# Patient Record
Sex: MALE | Race: WHITE | NOT HISPANIC OR LATINO | URBAN - METROPOLITAN AREA
[De-identification: names, ages, dates, MRNs, and addresses within clinical notes are randomized per-mention and may not be internally consistent; named-entity substitution may affect disease eponyms.]

---

## 2017-11-28 ENCOUNTER — OUTPATIENT (OUTPATIENT)
Dept: OUTPATIENT SERVICES | Facility: HOSPITAL | Age: 22
LOS: 1 days | End: 2017-11-28
Payer: COMMERCIAL

## 2017-11-28 PROCEDURE — 73130 X-RAY EXAM OF HAND: CPT | Mod: 26,RT

## 2017-11-28 PROCEDURE — 73130 X-RAY EXAM OF HAND: CPT

## 2024-09-03 ENCOUNTER — APPOINTMENT (OUTPATIENT)
Dept: ORTHOPEDIC SURGERY | Facility: CLINIC | Age: 29
End: 2024-09-03
Payer: OTHER MISCELLANEOUS

## 2024-09-03 VITALS — HEIGHT: 72 IN | BODY MASS INDEX: 25.87 KG/M2 | WEIGHT: 191 LBS

## 2024-09-03 DIAGNOSIS — S97.81XA CRUSHING INJURY OF RIGHT FOOT, INITIAL ENCOUNTER: ICD-10-CM

## 2024-09-03 DIAGNOSIS — S92.411A DISPLACED FRACTURE OF PROXIMAL PHALANX OF RIGHT GREAT TOE, INITIAL ENCOUNTER FOR CLOSED FRACTURE: ICD-10-CM

## 2024-09-03 DIAGNOSIS — Z78.9 OTHER SPECIFIED HEALTH STATUS: ICD-10-CM

## 2024-09-03 PROCEDURE — L3260: CPT | Mod: RT

## 2024-09-03 PROCEDURE — 73630 X-RAY EXAM OF FOOT: CPT | Mod: RT

## 2024-09-03 PROCEDURE — 99204 OFFICE O/P NEW MOD 45 MIN: CPT | Mod: 25

## 2024-09-03 NOTE — WORK
[Crush Injury] : crush injury [Fracture] : fracture [Was the competent medical cause of the injury] : was the competent medical cause of the injury [Are consistent with the injury] : are consistent with the injury [Consistent with my objective findings] : consistent with my objective findings [Total (100%)] : total (100%) [Does not reveal pre-existing condition(s) that may affect treatment/prognosis] : does not reveal pre-existing condition(s) that may affect treatment/prognosis [Other: ___] : [unfilled] [Cannot return to work because ________] : cannot return to work because [unfilled] [N/A] : : Not Applicable [Patient] : patient [No Rx restrictions] : No Rx restrictions. [I provided the services listed above] :  I provided the services listed above. [FreeTextEntry1] : good

## 2024-09-03 NOTE — PHYSICAL EXAM
[Right] : right foot and ankle [Moderate] : moderate swelling of toe(s) [1st] : 1st [NL (20)] : dorsiflexion 20 degrees [NL (40)] : plantar flexion 40 degrees [5___] : plantar flexion 5[unfilled]/5 [2+] : dorsalis pedis pulse: 2+ [] : patient ambulates without assistive device [FreeTextEntry3] : abrasion dorsal great toe  no overt signs of infection

## 2024-09-03 NOTE — ASSESSMENT
[FreeTextEntry1] : wbat post op shoe toe spacer ice/elevate nsaids prn out from work f/up 3 wks w/ foot xray

## 2024-09-03 NOTE — HISTORY OF PRESENT ILLNESS
[8] : 8 [1] : 2 [Not working due to injury] : Work status: not working due to injury [de-identified] : 09/03/2024:  heavy piece of metal fell on foot yesterday at work.  no treatment to date. no prior foot probs. denies dm/tob.  -- out since injury. was wearing sneakers when injury occurred [] : no [FreeTextEntry1] : right foot [de-identified] :

## 2024-09-24 ENCOUNTER — APPOINTMENT (OUTPATIENT)
Dept: ORTHOPEDIC SURGERY | Facility: CLINIC | Age: 29
End: 2024-09-24
Payer: OTHER MISCELLANEOUS

## 2024-09-24 DIAGNOSIS — S92.411D DISPLACED FRACTURE OF PROXIMAL PHALANX OF RIGHT GREAT TOE, SUBSEQUENT ENCOUNTER FOR FRACTURE WITH ROUTINE HEALING: ICD-10-CM

## 2024-09-24 PROCEDURE — 99213 OFFICE O/P EST LOW 20 MIN: CPT

## 2024-09-24 PROCEDURE — 73630 X-RAY EXAM OF FOOT: CPT | Mod: RT

## 2024-09-24 NOTE — WORK
[Crush Injury] : crush injury [Fracture] : fracture [Was the competent medical cause of the injury] : was the competent medical cause of the injury [Are consistent with the injury] : are consistent with the injury [Consistent with my objective findings] : consistent with my objective findings [Does not reveal pre-existing condition(s) that may affect treatment/prognosis] : does not reveal pre-existing condition(s) that may affect treatment/prognosis [Other: ___] : [unfilled] [N/A] : : Not Applicable [Patient] : patient [No Rx restrictions] : No Rx restrictions. [I provided the services listed above] :  I provided the services listed above. [Partial] : partial [Can return to work without limitations on ______] : can return to work without limitations on [unfilled] [FreeTextEntry1] : good

## 2024-09-24 NOTE — ASSESSMENT
[FreeTextEntry1] : wbat supportive shoe ice/elevate nsaids prn may return to full duty f/up 6 wks w/ toe xray

## 2024-09-24 NOTE — HISTORY OF PRESENT ILLNESS
[8] : 8 [1] : 2 [Not working due to injury] : Work status: not working due to injury [de-identified] : 09/03/2024:  heavy piece of metal fell on foot yesterday at work.  no treatment to date. no prior foot probs. denies dm/tob.  -- out since injury. was wearing sneakers when injury occurred  09/24/2024:  improving.  Walking in regular shoes.  not working.   [] : no [FreeTextEntry1] : right foot [de-identified] :

## 2024-09-24 NOTE — PHYSICAL EXAM
[Right] : right foot and ankle [1st] : 1st [NL (40)] : plantar flexion 40 degrees [2+] : dorsalis pedis pulse: 2+ [Mild] : mild swelling of toe(s) [NL 30)] : inversion 30 degrees [NL (20)] : eversion 20 degrees [5___] : [facility] 5[unfilled]/5 [] : non-antalgic [FreeTextEntry8] : improved